# Patient Record
Sex: FEMALE | Race: WHITE | NOT HISPANIC OR LATINO | URBAN - METROPOLITAN AREA
[De-identification: names, ages, dates, MRNs, and addresses within clinical notes are randomized per-mention and may not be internally consistent; named-entity substitution may affect disease eponyms.]

---

## 2023-12-20 ENCOUNTER — OFFICE VISIT (OUTPATIENT)
Dept: URGENT CARE | Age: 28
End: 2023-12-20
Payer: COMMERCIAL

## 2023-12-20 VITALS — HEART RATE: 83 BPM | TEMPERATURE: 97.8 F | RESPIRATION RATE: 18 BRPM | OXYGEN SATURATION: 99 %

## 2023-12-20 DIAGNOSIS — R30.0 DYSURIA: Primary | ICD-10-CM

## 2023-12-20 LAB
SL AMB  POCT GLUCOSE, UA: NEGATIVE
SL AMB LEUKOCYTE ESTERASE,UA: ABNORMAL
SL AMB POCT BILIRUBIN,UA: NEGATIVE
SL AMB POCT BLOOD,UA: ABNORMAL
SL AMB POCT CLARITY,UA: ABNORMAL
SL AMB POCT COLOR,UA: YELLOW
SL AMB POCT KETONES,UA: NEGATIVE
SL AMB POCT NITRITE,UA: NEGATIVE
SL AMB POCT PH,UA: 7.5
SL AMB POCT SPECIFIC GRAVITY,UA: 1.02
SL AMB POCT URINE HCG: NEGATIVE
SL AMB POCT URINE PROTEIN: NEGATIVE
SL AMB POCT UROBILINOGEN: 0.2

## 2023-12-20 PROCEDURE — 99213 OFFICE O/P EST LOW 20 MIN: CPT

## 2023-12-20 PROCEDURE — 87086 URINE CULTURE/COLONY COUNT: CPT

## 2023-12-20 PROCEDURE — 81025 URINE PREGNANCY TEST: CPT

## 2023-12-20 PROCEDURE — 81002 URINALYSIS NONAUTO W/O SCOPE: CPT

## 2023-12-20 RX ORDER — NITROFURANTOIN 25; 75 MG/1; MG/1
100 CAPSULE ORAL 2 TIMES DAILY
Qty: 10 CAPSULE | Refills: 0 | Status: SHIPPED | OUTPATIENT
Start: 2023-12-20 | End: 2023-12-25

## 2023-12-20 RX ORDER — NORGESTIMATE AND ETHINYL ESTRADIOL 7DAYSX3 28
1 KIT ORAL DAILY
COMMUNITY
Start: 2023-08-25

## 2023-12-20 NOTE — PATIENT INSTRUCTIONS
Take antibiotic as prescribed. Recommend probiotic use while taking antibiotic.   Hydrate.  Urine culture sent.   Azo OTC.   Follow-up with PCP in 3-5 days. Go to ER if symptoms worsen.

## 2023-12-20 NOTE — PROGRESS NOTES
[facility]        NAME: Debbie Cutler is a 28 y.o. female  : 1995    MRN: [id number]  DATE: 2023  TIME: 2:16 PM      Assessment and Plan     Dysuria [R30.0]  1. Dysuria  POCT urine dip    POCT urine HCG    Urine culture      Poct hcg negative.   Poct urine dip shows large amount of leukocytes, trace amount of blood.   Urine culture sent.  No prior urine culture available for comparison.     Patient Instructions     .Take antibiotic as prescribed. Recommend probiotic use while taking antibiotic.   Hydrate.  Urine culture sent.   Azo OTC.   Follow-up with PCP in 3-5 days. Go to ER if symptoms worsen.     Chief Complaint     Chief Complaint   Patient presents with    Possible UTI     Patient relates frequency, burning on urination. Started 5 days ago.         History of Present Illness     Patient is a 28-year-old female who presents with dysuria for 5 days.  Denies fever.  Denies vomiting or diarrhea.  Denies abdominal pain or back pain.  Reports history of urinary tract infections.  Unsure what medication she was treated with in the past.        Review of Systems     Review of Systems   Constitutional:  Negative for chills and fever.   Gastrointestinal:  Negative for abdominal pain, diarrhea, nausea and vomiting.   Genitourinary:  Positive for dysuria.   Musculoskeletal:  Negative for back pain.   All other systems reviewed and are negative.        Current Medications       Current Outpatient Medications:     norgestimate-ethinyl estradiol (ORTHO TRI-CYCLEN,TRINESSA) 0.18/0.215/0.25 MG-35 MCG per tablet, Take 1 tablet by mouth daily, Disp: , Rfl:     Current Allergies     Allergies as of 2023    (No Known Allergies)              The following portions of the patient's history were reviewed and updated as appropriate: allergies, current medications, past family history, past medical history, past social history, past surgical history and problem list.     No past medical history on  file.    No past surgical history on file.    No family history on file.      Medications have been verified.        Objective     Pulse 83   Temp 97.8 °F (36.6 °C)   Resp 18   SpO2 99%   No LMP recorded.         Physical Exam     Physical Exam  Vitals and nursing note reviewed.   Constitutional:       General: She is awake. She is not in acute distress.     Appearance: Normal appearance. She is not ill-appearing, toxic-appearing or diaphoretic.   HENT:      Mouth/Throat:      Lips: Pink.      Mouth: Mucous membranes are moist.   Cardiovascular:      Rate and Rhythm: Normal rate.      Pulses: Normal pulses.      Heart sounds: Normal heart sounds, S1 normal and S2 normal.   Pulmonary:      Effort: Pulmonary effort is normal.      Breath sounds: Normal breath sounds and air entry.   Abdominal:      General: Abdomen is flat. Bowel sounds are normal.      Palpations: Abdomen is soft.      Tenderness: There is no abdominal tenderness.   Skin:     General: Skin is warm.      Capillary Refill: Capillary refill takes less than 2 seconds.   Neurological:      Mental Status: She is alert.   Psychiatric:         Mood and Affect: Mood normal.         Behavior: Behavior normal.         Thought Content: Thought content normal.         Judgment: Judgment normal.

## 2023-12-21 LAB — BACTERIA UR CULT: NORMAL
